# Patient Record
Sex: FEMALE | Race: BLACK OR AFRICAN AMERICAN | URBAN - METROPOLITAN AREA
[De-identification: names, ages, dates, MRNs, and addresses within clinical notes are randomized per-mention and may not be internally consistent; named-entity substitution may affect disease eponyms.]

---

## 2017-03-16 ENCOUNTER — CARE COORDINATION (OUTPATIENT)
Dept: CARE COORDINATION | Facility: CLINIC | Age: 32
End: 2017-03-16

## 2017-03-16 NOTE — PROGRESS NOTES
3/17/2017 Clinic Care Coordination Contact  OUTREACH -Social Work initial   Referral Information:  Referral Source: Care Team  Reason for Contact: Pregnant/ No insurance. Wants to make  Appointment       Universal Utilization:    Last PCP appointment: 11/21/16     Concerns:  (Pregnant no insurance)   Clinical Concerns:  Current Medical Concerns: Newly Pregnant- first baby, anxious to make DR ramirez, needs insurance., Anemia    Current Behavioral Concerns: none listed   Education Provided to patient: Role of SW CC, resources for insurance    Functional Status:  Mobility Status: Independent  Equipment Currently Used at Home: none  Transportation: Has a car and drives     Psychosocial:  Current living arrangement:: I live alone in an apt. Might be moving in the Father of this baby. States in a safe relationship.  This is boyfriend's 2nd baby. This is her first pregnancy. Planning to parent.  Financial/Insurance: Employed full time in [employer] at [employer].  Works nights.  Found out her MA was ending after employer had open enrollment.  She understands she cannot apply until next November.   Discussed change in status might allow her to apply now.  She will go to [facility] . No financial needs identified at this time.  Resources and Interventions:  Current Resources:  [facility],  [place] [employer],     Goals:   Goal 1 Statement:  (I need Medical Insurance)  Goal 1 Progression Percent: 20%  Goal 1 Progression Date: 03/17/17     Barriers: none  Strengths: able to visit the [facility] or apply for [place]  Patient/Caregiver understanding: Appears to understand,   Frequency of Care Coordination:  (4 -5 weeks)     Plan:  Pt will visit [facility] and check with HR regarding insurance  SW  Will follow up in 3 weeks.     [facility]eche Bragg [facility]  , Clinic Care Coordination  Clinics:  [place],  [place],  [place]  [phone]   3/17/2017   10:38 AM    3/17/2017 Clinic Care Coordination Contact  [facility]/Voicemail    Referral Source: Care Team  Clinical Data: Care Coordinator Outreach  Outreach attempted x 2.  Left message on voicemail with call back information and requested return call.  Plan: If no response,  Care Coordinator will try to reach patient again in 3-5 business days.    [facility] Bragg [facility]  , Clinic Care Coordination  Clinics:  [place]  [phone]   3/17/2017   9:05 AM    3/16/2017 Clinic Care Coordination Contact  [facility]/Voicemail / Social Work     Referral Source: Care Team  Clinical Data: Care Coordinator Outreach  Outreach attempted x 1.  Left message on voicemail with call back information and requested return call.  Plan:  Care Coordinator will try to reach patient again in 1-2 business days.    [facility] Bragg [facility]  , Clinic Care Coordination  Clinics:  [place]  [phone]   3/16/2017   11:22 AM

## 2017-03-17 PROBLEM — Z76.89 HEALTH CARE HOME: Status: ACTIVE | Noted: 2017-03-17

## 2017-04-05 ENCOUNTER — CARE COORDINATION (OUTPATIENT)
Dept: CARE COORDINATION | Facility: CLINIC | Age: 32
End: 2017-04-05

## 2017-04-05 NOTE — PROGRESS NOTES
4/5/2017 Clinic Care Coordination Contact  [facility]/Voicemail - Social Work     Referral Source: Care Team  Clinical Data: Care Coordinator Outreach  Outreach attempted x 1 as a follow up to our last conversation.  Left message on voicemail with call back information and requested return call.    Plan:  If no response, . Care Coordinator will try to reach patient again in 5-7 business days.  [facility]ec[facility] Bragg [facility]  , Clinic Care Coordination  Clinics:  [place], [place]  [phone]   4/5/2017   4:04 PM

## 2017-04-06 ENCOUNTER — CARE COORDINATION (OUTPATIENT)
Dept: CARE COORDINATION | Facility: CLINIC | Age: 32
End: 2017-04-06

## 2017-04-06 NOTE — PROGRESS NOTES
2017 Clinic Care Coordination Contact  Social Work   Received a return call from pt.  She works nights and wanted to seema before she goes to sleep.  She reports she did apply for medical Assistance, 1.5 weeks ago.   She thought she had to wait to receive her card before making a doctor appt.  When she applied she was told she was eligible as a pregnant woman.   Encouraged he to call to make an OB apt.  She has not yet seen a dr and is anxious to start her pre  care.  Father of baby helps her if she needs something. They are not living together.  Pt is unable to identify any other needs at this time.    Plan: Pt will schedule an OB appt.   SW will follow up on insurance and appt in 4 weeks    [facility]eche Bragg [facility]  , Clinic Care Coordination  Clinics:  [place], [place]  [phone]   2017   9:23 AM

## 2017-05-04 ENCOUNTER — CARE COORDINATION (OUTPATIENT)
Dept: CARE COORDINATION | Facility: CLINIC | Age: 32
End: 2017-05-04

## 2017-05-04 NOTE — PROGRESS NOTES
5/4/2017  Care Coordination Closing:  Social Work    Follow up call to pt.   [place] did re [place] and now has [place].  She will be receiving her OB care at [facility].   Plan:   Patient is being closed to Care Coordination at this time. Will receive care outside of our system. Patient can be re-referred to Care Coordination in the future, should the need arise.    [facility]eche Bragg [facility]  , Clinic Care Coordination  Clinics:  [place], [place]  [phone]   5/4/2017   9:52 AM

## 2018-01-07 ENCOUNTER — HEALTH MAINTENANCE LETTER (OUTPATIENT)
Age: 33
End: 2018-01-07

## 2018-01-21 ENCOUNTER — HEALTH MAINTENANCE LETTER (OUTPATIENT)
Age: 33
End: 2018-01-21

## 2018-04-20 ENCOUNTER — TELEPHONE (OUTPATIENT)
Dept: FAMILY MEDICINE | Facility: CLINIC | Age: 33
End: 2018-04-20

## 2018-04-20 NOTE — TELEPHONE ENCOUNTER
Pt is past due for f/u pap smear.  Reminder letter was sent 12/11/17.  Spoke with pt, states she has transferred care to [facility].  Lorraine Wyatt,    Pap Tracking

## 2020-03-10 ENCOUNTER — HEALTH MAINTENANCE LETTER (OUTPATIENT)
Age: 35
End: 2020-03-10

## 2020-12-27 ENCOUNTER — HEALTH MAINTENANCE LETTER (OUTPATIENT)
Age: 35
End: 2020-12-27

## 2021-04-24 ENCOUNTER — HEALTH MAINTENANCE LETTER (OUTPATIENT)
Age: 36
End: 2021-04-24

## 2021-10-09 ENCOUNTER — HEALTH MAINTENANCE LETTER (OUTPATIENT)
Age: 36
End: 2021-10-09

## 2022-05-16 ENCOUNTER — HEALTH MAINTENANCE LETTER (OUTPATIENT)
Age: 37
End: 2022-05-16

## 2022-09-11 ENCOUNTER — HEALTH MAINTENANCE LETTER (OUTPATIENT)
Age: 37
End: 2022-09-11

## 2023-06-03 ENCOUNTER — HEALTH MAINTENANCE LETTER (OUTPATIENT)
Age: 38
End: 2023-06-03

## 2024-02-24 ENCOUNTER — HEALTH MAINTENANCE LETTER (OUTPATIENT)
Age: 39
End: 2024-02-24

## 2024-06-17 PROBLEM — Z76.89 HEALTH CARE HOME: Status: RESOLVED | Noted: 2017-03-17 | Resolved: 2024-06-17
